# Patient Record
Sex: FEMALE | Race: WHITE | NOT HISPANIC OR LATINO | Employment: UNEMPLOYED | URBAN - METROPOLITAN AREA
[De-identification: names, ages, dates, MRNs, and addresses within clinical notes are randomized per-mention and may not be internally consistent; named-entity substitution may affect disease eponyms.]

---

## 2017-12-08 ENCOUNTER — OFFICE VISIT (OUTPATIENT)
Dept: URGENT CARE | Age: 67
End: 2017-12-08
Payer: MEDICARE

## 2017-12-08 ENCOUNTER — TRANSCRIBE ORDERS (OUTPATIENT)
Dept: URGENT CARE | Age: 67
End: 2017-12-08

## 2017-12-08 PROCEDURE — G0463 HOSPITAL OUTPT CLINIC VISIT: HCPCS | Performed by: FAMILY MEDICINE

## 2017-12-08 PROCEDURE — 99213 OFFICE O/P EST LOW 20 MIN: CPT | Performed by: FAMILY MEDICINE

## 2017-12-09 NOTE — PROGRESS NOTES
Assessment    1  Sinusitis (473 9) (J32 9)    Plan  Sinusitis    · Amoxicillin-Pot Clavulanate 875-125 MG Oral Tablet (Augmentin); TAKE 1 TABLETEVERY 12 HOURS DAILY    Discussion/Summary  Discussion Summary:   Take Augmentin twice daily for 10 daysdailyfluids and restmassages as discussedor motrin as needed for headache or discomfort  Medication Side Effects Reviewed: Possible side effects of new medications were reviewed with the patient/guardian today  Understands and agrees with treatment plan: The treatment plan was reviewed with the patient/guardian  The patient/guardian understands and agrees with the treatment plan   Follow Up Instructions: Follow Up with your Primary Care Provider in 10 days  If your symptoms worsen, go to the nearest [place] [address] Emergency Department  Chief Complaint    1  Cold Symptoms  Chief Complaint Free Text Note Form: Sinus pain and pressure      History of Present Illness  HPI: 73 y/o female presents with c/o cold symptoms for 2 weeks  She states that she felt better after 7-8 days of fieeling ill, then symptoms returned/worsened  She c/o thick green nasal drainage, b/l ear pressure, facial pressure  No cough  Hospital Based Practices Required Assessment:  Abuse And Domestic Violence Screen   Yes, the patient is safe at home  -- The patient states no one is hurting them  Depression And Suicide Screen  No, the patient has not had thoughts of hurting themself  No, the patient has not felt depressed in the past 7 days  Prefered Language is  [place]  Primary Language is  English  Review of Systems  Focused-Female:  Constitutional: feeling poorly-- and-- feeling tired, but-- no fever  ENT: earache-- and-- nasal discharge, but-- as noted in HPI-- and-- no sore throat  Cardiovascular: no chest pain  Respiratory: no shortness of breath-- and-- no cough  Neurological: headache  ROS Reviewed:   ROS reviewed  Active Problems  1   Abrasion, face w/o infection (910 0)   2  Contusion of face (920) (S00 83XA)   3  Forehead laceration (873 42) (S01 81XA)   4  Fracture of radius, left, closed (813 81) (S52 92XA)   5  Hypertension (401 9) (I10)   6  Pain, arm, left (729 5) (M79 602)    Past Medical History  Active Problems And Past Medical History Reviewed: The active problems and past medical history were reviewed and updated today  Family History  Family History Reviewed: The family history was reviewed and updated today  Social History   · Former smoker (P95 65) (R83 883)   · Social alcohol use (Z78 9)  Social History Reviewed: The social history was reviewed and updated today  The social history was reviewed and is unchanged  Surgical History    1  History of Knee Surgery Right  Surgical History Reviewed: The surgical history was reviewed and updated today  Current Meds   1  Pravastatin Sodium 20 MG Oral Tablet; Therapy: (Recorded:[id number]) to Recorded  Medication List Reviewed: The medication list was reviewed and updated today  Allergies    1  No Known Drug Allergies    Vitals  Signs   Recorded: [id number] 02:36PM   Temperature: 98 2 F  Heart Rate: 86  Respiration: 16  Systolic: 189  Diastolic: 65  Height: 5 ft 8 in  Weight: 145 lb   BMI Calculated: 22 05  BSA Calculated: 1 78  O2 Saturation: 96    Physical Exam   Constitutional  General appearance: No acute distress, well appearing and well nourished  Ears, Nose, Mouth, and Throat  External inspection of ears and nose: Normal    Otoscopic examination: Tympanic membranes translucent with normal light reflex  Canals patent without erythema  Nasal mucosa, septum, and turbinates: Abnormal   There was a mucoid discharge from both nares  The bilateral nasal mucosa was edematous  -- TTP maxillary sinuses  Oropharynx: Normal with no erythema, edema, exudate or lesions  Pulmonary  Respiratory effort: No increased work of breathing or signs of respiratory distress     Auscultation of lungs: Clear to auscultation  Cardiovascular  Auscultation of heart: Normal rate and rhythm, normal S1 and S2, without murmurs  Lymphatic  Palpation of lymph nodes in neck: No lymphadenopathy     Psychiatric  Orientation to person, place, and time: Normal    Mood and affect: Normal        Signatures   Electronically signed by : Ashkan Saavedra [facility]; Dec  8 2017  2:52PM EST                       (Author)    Electronically signed by : Tom Max DO; Dec  8 2017  3:26PM EST                       (Co-author)

## 2018-01-23 VITALS
RESPIRATION RATE: 16 BRPM | HEIGHT: 68 IN | TEMPERATURE: 98.2 F | HEART RATE: 86 BPM | SYSTOLIC BLOOD PRESSURE: 144 MMHG | OXYGEN SATURATION: 96 % | WEIGHT: 145 LBS | DIASTOLIC BLOOD PRESSURE: 65 MMHG | BODY MASS INDEX: 21.98 KG/M2

## 2018-01-24 NOTE — MISCELLANEOUS
Message  12/10/2017 - 08:28 AM : Called by patient, vomiting frequently with Augmentin, history of diarrhea with erythromycin; will send Rx to [facility] for Bactrim      Plan  Sinusitis    · Amoxicillin-Pot Clavulanate 875-125 MG Oral Tablet (Augmentin); TAKE 1 TABLET  EVERY 12 HOURS DAILY   · Sulfamethoxazole-Trimethoprim 800-160 MG Oral Tablet (Bactrim DS); TAKE 1  TABLET TWICE DAILY UNTIL FINISHED    Signatures   Electronically signed by : Anai Lemus DO; Dec 10 2017  8:33AM EST                       (Author)